# Patient Record
Sex: MALE | Race: WHITE | Employment: FULL TIME | URBAN - METROPOLITAN AREA
[De-identification: names, ages, dates, MRNs, and addresses within clinical notes are randomized per-mention and may not be internally consistent; named-entity substitution may affect disease eponyms.]

---

## 2018-08-04 ENCOUNTER — OFFICE VISIT (OUTPATIENT)
Dept: URGENT CARE | Facility: URGENT CARE | Age: 33
End: 2018-08-04
Payer: COMMERCIAL

## 2018-08-04 VITALS
WEIGHT: 153.8 LBS | RESPIRATION RATE: 16 BRPM | DIASTOLIC BLOOD PRESSURE: 82 MMHG | SYSTOLIC BLOOD PRESSURE: 144 MMHG | OXYGEN SATURATION: 98 % | HEART RATE: 58 BPM

## 2018-08-04 DIAGNOSIS — L23.7 CONTACT DERMATITIS DUE TO POISON IVY: Primary | ICD-10-CM

## 2018-08-04 PROCEDURE — 99213 OFFICE O/P EST LOW 20 MIN: CPT | Performed by: NURSE PRACTITIONER

## 2018-08-04 RX ORDER — METHYLPREDNISOLONE 16 MG/1
TABLET ORAL
Qty: 4 TABLET | Refills: 0 | Status: SHIPPED | OUTPATIENT
Start: 2018-08-04 | End: 2018-08-04

## 2018-08-04 RX ORDER — METHYLPREDNISOLONE 4 MG
TABLET, DOSE PACK ORAL
Qty: 21 TABLET | Refills: 0 | Status: SHIPPED | OUTPATIENT
Start: 2018-08-04

## 2018-08-04 ASSESSMENT — PAIN SCALES - GENERAL: PAINLEVEL: NO PAIN (0)

## 2018-08-04 NOTE — PROGRESS NOTES
.  SUBJECTIVE:  Ra Cee is a 33 year old male who presents to the clinic today for a rash from poison ivy.  Onset of rash was 4 day(s) ago after working on the ground and found poison ivy and didn't know.   Rash is gradual onset and worsening.  Location of the rash: shelly arms and neck.  Quality/symptoms of rash: itching and red   Symptoms are moderate and rash seems to be worsening.  Previous history of a similar rash? Yes: with poison ivy exposure   Recent exposure history: none known    Associated symptoms include: nothing.    No past medical history on file.  No current outpatient prescriptions on file.     Social History   Substance Use Topics     Smoking status: Not on file     Smokeless tobacco: Not on file     Alcohol use Not on file       ROS:  Review of systems negative except as stated above.    EXAM:   There were no vitals taken for this visit.  GENERAL: alert, no acute distress.  SKIN: Rash description:    Distribution: localized  Location: upper extremities with left side worse and left side of neck  Color: red,  Lesion type: vesicular, linear, round with no other findings  GENERAL APPEARANCE: healthy, alert and no distress  EYES: EOMI,  PERRL, conjunctiva clear  NECK: supple, non-tender to palpation, no adenopathy noted  RESP: lungs clear to auscultation - no rales, rhonchi or wheezes  CV: regular rates and rhythm, normal S1 S2, no murmur noted    ASSESSMENT:  Contact dermatitis     PLAN:  OTC and home interventions reviewed. Medrol ordered and will follow-up with primary clinic if not improving

## 2018-08-04 NOTE — MR AVS SNAPSHOT
After Visit Summary   8/4/2018    Ra Cee    MRN: [id number]           Patient Information     Date Of Birth          1985        Visit Information        Provider Department      8/4/2018 4:00 PM Saira Tovar APRN CNP [facility]        Today's Diagnoses     Contact dermatitis due to poison ivy    -  1       Follow-ups after your visit        Who to contact     If you have questions or need follow up information about today's clinic visit or your schedule please contact [facility] directly at [phone].  Normal or non-critical lab and imaging results will be communicated to you by MyChart, letter or phone within 4 business days after the clinic has received the results. If you do not hear from us within 7 days, please contact the clinic through MyChart or phone. If you have a critical or abnormal lab result, we will notify you by phone as soon as possible.  Submit refill requests through [employer] or call your pharmacy and they will forward the refill request to us. Please allow 3 business days for your refill to be completed.          Additional Information About Your Visit        Care EveryWhere ID     This is your Care EveryWhere ID. This could be used by other organizations to access your [facility] medical records  [id number]        Your Vitals Were     Pulse Respirations Pulse Oximetry             58 16 98%          Blood Pressure from Last 3 Encounters:   08/04/18 144/82    Weight from Last 3 Encounters:   08/04/18 153 lb 12.8 oz (69.8 kg)              Today, you had the following     No orders found for display         Today's Medication Changes          These changes are accurate as of 8/4/18  4:11 PM.  If you have any questions, ask your nurse or doctor.               Start taking these medicines.        Dose/Directions    methylPREDNISolone 16 MG tablet   Commonly known as:  MEDROL   Used for:  Contact  dermatitis due to poison ivy   Started by:  Saira Tovar APRN CNP        Take 32 mg by mouth 12 hours before the procedure and repeat 32 mg by mouth 2 hours before the procedure to prevent contrast reaction.   Quantity:  4 tablet   Refills:  0            Where to get your medicines      These medications were sent to [id number] - [place], [place] - [address]  [address] [address]    Hours:  Closed 10-16-08 business to [facility] Phone:  [phone]     methylPREDNISolone 16 MG tablet                Primary Care Provider Fax #    Physician No Ref-Primary [phone]       No address on file        Equal Access to Services     [facility]: Naresh Fenton, luis franco, juanita prieto, cydney anglin . So [facility] [phone].    ATENCIÓN: Si habla español, tiene a benz disposición servicios gratuitos de asistencia lingüística. [facility] [phone].    We comply with applicable federal civil rights laws and [place] laws. We do not discriminate on the basis of race, color, national origin, age, disability, sex, sexual orientation, or gender identity.            Thank you!     Thank you for choosing [facility]  for your care. Our goal is always to provide you with excellent care. Hearing back from our patients is one way we can continue to improve our services. Please take a few minutes to complete the written survey that you may receive in the mail after your visit with us. Thank you!             Your Updated Medication List - Protect others around you: Learn how to safely use, store and throw away your medicines at www.disposemymeds.org.          This list is accurate as of 8/4/18  4:11 PM.  Always use your most recent med list.                   Brand Name Dispense Instructions for use Diagnosis    methylPREDNISolone 16 MG tablet    MEDROL    4 tablet    Take 32 mg by mouth 12  hours before the procedure and repeat 32 mg by mouth 2 hours before the procedure to prevent contrast reaction.    Contact dermatitis due to poison ivy

## 2021-01-16 NOTE — NURSING NOTE
Airway   Date/Time: 1/15/2021 5:46 PM   Patient location during procedure: OR ([facility] - Operating Room or Procedural Area)    Staff -   Performed By: CRNA    Consent for Airway   Urgency: elective    Indications and Patient Condition  Indications for airway management: vanessa-procedural  Induction type:intravenousMask difficulty assessment: 1 - vent by mask    Final Airway Details  Final airway type: endotracheal airway  Successful airway:ETT - single  Endotracheal Airway Details   ETT size (mm): 7.0  Cuffed: yes  Successful intubation technique: direct laryngoscopy  Grade View of Cords: 1  Adjucts: stylet  Measured from: gums/teeth  Secured at (cm): 21  Secured with: pink tape  Bite block used: None    Post intubation assessment   Number of attempts at approach: 1  Number of other approaches attempted: 0  Secured with:pink tape  Ease of procedure: easy  Dentition: Intact and Unchanged         Chief Complaint   Patient presents with     Poison Ivy     On arms, neck and legs- has been there since 7/31/18.      Emelina KUMAR CMA